# Patient Record
Sex: FEMALE | Race: WHITE | NOT HISPANIC OR LATINO
[De-identification: names, ages, dates, MRNs, and addresses within clinical notes are randomized per-mention and may not be internally consistent; named-entity substitution may affect disease eponyms.]

---

## 2022-08-22 PROBLEM — Z00.00 ENCOUNTER FOR PREVENTIVE HEALTH EXAMINATION: Status: ACTIVE | Noted: 2022-08-22

## 2022-08-22 NOTE — HISTORY OF PRESENT ILLNESS
[de-identified] : TONY SORIANO is a 47 year old female with a PMH of HTN, HLD, DM II, T12-L1 laminectomy posterior instrumented fusion June 2021 by Dr. Hoyt at [facility] *** who presents to the office today self referred for neurosurgical consultation due to herniated disc. She has low back pain.\par The pain is characterized as *** in nature.  It started **** ago. It is exacerbated by *** and relieved by ***.  It radiates ***. There has been no known trauma precipitating the pain..  The patient denies numbness of extremities, weakness of extremities, bowel or bladder incontinence and gait disturbance.  ***has tried BRENNEN, PT, and pain medications including *** in the last *** without relief.  *** has undergone imaging in the form of *** which revealed *** (see detailed report below) \par PMH: per HPI\par PSH:\par FamHx:\par Social Hx:\par Allergies: \par Medications:\par  \par

## 2022-08-22 NOTE — ASSESSMENT
[FreeTextEntry1] : I have discussed the natural history and treatment options for lumbar radiculopathy due to disc herniation with the patient. I explained the indications for observation, conservative management, medical management, physical therapy, pain management approaches and surgery. I explained the different types and surgical approaches including decompression only procedures and instrumented fusions. I discussed the risks, benefits, possible complications and expected outcome related to each treatment option. The risks of surgery were discussed in detail including but not limited to postoperative infection at the surgical site, hospital acquired pneumonia, hospital acquired urinary tract infection, postoperative meningitis, wound dehiscence, CSF leak, stroke (ischemic and hemorrhagic), postoperative seizures, worsening motor function due to spinal cord or nerve injury, postoperative visual deficit which could be permanent (blindness) when surgery is performed in a prone position, cardiovascular complications (MI, PE, DVT) and I also explained that some of these complications could lead to sepsis, coma or even death. I discussed the fact that some of these complications may require subsequent surgical procedure(s) to correct them. \par In the end, I recommend ***\par The patient understands the plan of care and is in agreement.  All questions answered to patient satisfaction.

## 2022-08-29 ENCOUNTER — APPOINTMENT (OUTPATIENT)
Dept: NEUROSURGERY | Facility: CLINIC | Age: 47
End: 2022-08-29

## 2022-09-14 ENCOUNTER — TRANSCRIPTION ENCOUNTER (OUTPATIENT)
Age: 47
End: 2022-09-14

## 2022-09-17 ENCOUNTER — TRANSCRIPTION ENCOUNTER (OUTPATIENT)
Age: 47
End: 2022-09-17

## 2022-12-15 ENCOUNTER — OFFICE (OUTPATIENT)
Dept: URBAN - METROPOLITAN AREA CLINIC 29 | Facility: CLINIC | Age: 47
Setting detail: OPHTHALMOLOGY
End: 2022-12-15
Payer: MEDICARE

## 2022-12-15 DIAGNOSIS — H01.004: ICD-10-CM

## 2022-12-15 DIAGNOSIS — E11.9: ICD-10-CM

## 2022-12-15 DIAGNOSIS — H01.001: ICD-10-CM

## 2022-12-15 DIAGNOSIS — H01.005: ICD-10-CM

## 2022-12-15 DIAGNOSIS — H16.223: ICD-10-CM

## 2022-12-15 DIAGNOSIS — H43.393: ICD-10-CM

## 2022-12-15 DIAGNOSIS — H01.002: ICD-10-CM

## 2022-12-15 DIAGNOSIS — H43.813: ICD-10-CM

## 2022-12-15 PROCEDURE — 92014 COMPRE OPH EXAM EST PT 1/>: CPT | Performed by: OPHTHALMOLOGY

## 2022-12-15 ASSESSMENT — REFRACTION_CURRENTRX
OD_CYLINDER: 0.00
OS_SPHERE: -1.00
OS_CYLINDER: 0.00
OS_AXIS: 090
OD_ADD: +1.50
OS_OVR_VA: 20/
OS_ADD: +1.50
OD_AXIS: 090
OD_SPHERE: -0.50
OD_VPRISM_DIRECTION: BF
OD_OVR_VA: 20/
OS_VPRISM_DIRECTION: BF

## 2022-12-15 ASSESSMENT — LID EXAM ASSESSMENTS
OS_MEIBOMITIS: 2+
OD_MEIBOMITIS: RLL 2+
OD_BLEPHARITIS: RLL RUL 3+
OS_BLEPHARITIS: LLL LUL 3+

## 2022-12-15 ASSESSMENT — REFRACTION_MANIFEST
OD_VA1: 20/25+1
OS_SPHERE: +2.25
OU_VA: 20/20-2
OD_SPHERE: +0.25
OS_ADD: +2.00
OD_ADD: +2.00
OS_VA1: 20/20-2
OS_SPHERE: -0.25
OD_SPHERE: +2.25

## 2022-12-15 ASSESSMENT — REFRACTION_AUTOREFRACTION
OD_AXIS: 140
OD_CYLINDER: +0.50
OD_SPHERE: 0.00
OS_SPHERE: -0.25
OS_CYLINDER: +0.75
OS_AXIS: 50

## 2022-12-15 ASSESSMENT — TEAR BREAK UP TIME (TBUT)
OS_TBUT: 1+
OD_TBUT: 1+

## 2022-12-15 ASSESSMENT — CONFRONTATIONAL VISUAL FIELD TEST (CVF)
OS_FINDINGS: FULL
OD_FINDINGS: FULL

## 2022-12-15 ASSESSMENT — TONOMETRY
OD_IOP_MMHG: 19
OS_IOP_MMHG: 19

## 2022-12-15 ASSESSMENT — VISUAL ACUITY
OS_BCVA: 20/20
OD_BCVA: 20/40-2

## 2022-12-15 ASSESSMENT — SPHEQUIV_DERIVED
OS_SPHEQUIV: 0.125
OD_SPHEQUIV: 0.25

## 2023-01-16 ENCOUNTER — TRANSCRIPTION ENCOUNTER (OUTPATIENT)
Age: 48
End: 2023-01-16

## 2023-02-01 ENCOUNTER — TRANSCRIPTION ENCOUNTER (OUTPATIENT)
Age: 48
End: 2023-02-01

## 2023-03-08 ENCOUNTER — OFFICE (OUTPATIENT)
Dept: URBAN - METROPOLITAN AREA CLINIC 29 | Facility: CLINIC | Age: 48
Setting detail: OPHTHALMOLOGY
End: 2023-03-08
Payer: MEDICARE

## 2023-03-08 DIAGNOSIS — H10.9: ICD-10-CM

## 2023-03-08 PROCEDURE — 92012 INTRM OPH EXAM EST PATIENT: CPT | Performed by: OPTOMETRIST

## 2023-03-08 ASSESSMENT — VISUAL ACUITY
OS_BCVA: 20/20
OD_BCVA: 20/20-2

## 2023-03-08 ASSESSMENT — CONFRONTATIONAL VISUAL FIELD TEST (CVF)
OS_FINDINGS: FULL
OD_FINDINGS: FULL

## 2023-03-08 ASSESSMENT — LID EXAM ASSESSMENTS
OS_BLEPHARITIS: LLL LUL 3+
OD_MEIBOMITIS: RLL 2+
OD_BLEPHARITIS: RLL RUL 3+
OS_MEIBOMITIS: 2+

## 2023-03-08 ASSESSMENT — REFRACTION_CURRENTRX
OD_CYLINDER: 0.00
OD_ADD: +1.50
OD_OVR_VA: 20/
OS_AXIS: 090
OD_AXIS: 090
OD_SPHERE: -0.50
OD_VPRISM_DIRECTION: BF
OS_VPRISM_DIRECTION: BF
OS_OVR_VA: 20/
OS_ADD: +1.50
OS_SPHERE: -1.00
OS_CYLINDER: 0.00

## 2023-03-08 ASSESSMENT — REFRACTION_AUTOREFRACTION
OD_AXIS: 140
OS_AXIS: 50
OS_SPHERE: -0.25
OS_CYLINDER: +0.75
OD_SPHERE: 0.00
OD_CYLINDER: +0.50

## 2023-03-08 ASSESSMENT — REFRACTION_MANIFEST
OD_ADD: +2.00
OD_VA1: 20/25+1
OS_ADD: +2.00
OS_VA1: 20/20-2
OS_SPHERE: -0.25
OD_SPHERE: +2.25
OU_VA: 20/20-2
OD_SPHERE: +0.25
OS_SPHERE: +2.25

## 2023-03-08 ASSESSMENT — SPHEQUIV_DERIVED
OS_SPHEQUIV: 0.125
OD_SPHEQUIV: 0.25

## 2023-03-08 ASSESSMENT — TEAR BREAK UP TIME (TBUT)
OS_TBUT: 1+
OD_TBUT: 1+

## 2023-04-17 ENCOUNTER — HOSPITAL ENCOUNTER (EMERGENCY)
Dept: HOSPITAL 74 - FER | Age: 48
Discharge: LEFT BEFORE DISCHARGE | End: 2023-04-17
Payer: COMMERCIAL

## 2023-04-17 VITALS
HEART RATE: 78 BPM | SYSTOLIC BLOOD PRESSURE: 107 MMHG | DIASTOLIC BLOOD PRESSURE: 75 MMHG | TEMPERATURE: 98.1 F | RESPIRATION RATE: 19 BRPM

## 2023-04-17 VITALS — BODY MASS INDEX: 37.9 KG/M2

## 2023-04-17 DIAGNOSIS — R10.31: Primary | ICD-10-CM

## 2023-04-18 ENCOUNTER — OFFICE (OUTPATIENT)
Dept: URBAN - METROPOLITAN AREA CLINIC 29 | Facility: CLINIC | Age: 48
Setting detail: OPHTHALMOLOGY
End: 2023-04-18
Payer: MEDICARE

## 2023-04-18 DIAGNOSIS — H01.002: ICD-10-CM

## 2023-04-18 DIAGNOSIS — H01.004: ICD-10-CM

## 2023-04-18 DIAGNOSIS — E11.9: ICD-10-CM

## 2023-04-18 DIAGNOSIS — H43.393: ICD-10-CM

## 2023-04-18 DIAGNOSIS — H43.813: ICD-10-CM

## 2023-04-18 DIAGNOSIS — H01.005: ICD-10-CM

## 2023-04-18 DIAGNOSIS — H16.223: ICD-10-CM

## 2023-04-18 DIAGNOSIS — H01.001: ICD-10-CM

## 2023-04-18 PROCEDURE — 92014 COMPRE OPH EXAM EST PT 1/>: CPT | Performed by: OPHTHALMOLOGY

## 2023-04-18 PROCEDURE — 92201 OPSCPY EXTND RTA DRAW UNI/BI: CPT | Performed by: OPHTHALMOLOGY

## 2023-04-18 ASSESSMENT — REFRACTION_MANIFEST
OD_SPHERE: +2.25
OD_ADD: +2.00
OD_VA1: 20/25+1
OS_ADD: +2.00
OS_VA1: 20/20-2
OS_SPHERE: -0.25
OD_SPHERE: +0.25
OS_SPHERE: +2.25
OU_VA: 20/20-2

## 2023-04-18 ASSESSMENT — REFRACTION_CURRENTRX
OD_OVR_VA: 20/
OS_CYLINDER: 0.00
OS_AXIS: 090
OS_OVR_VA: 20/
OD_SPHERE: -0.50
OS_SPHERE: -1.00
OS_ADD: +1.50
OS_VPRISM_DIRECTION: BF
OD_AXIS: 090
OD_ADD: +1.50
OD_CYLINDER: 0.00
OD_VPRISM_DIRECTION: BF

## 2023-04-18 ASSESSMENT — TONOMETRY
OD_IOP_MMHG: 17
OS_IOP_MMHG: 18

## 2023-04-18 ASSESSMENT — REFRACTION_AUTOREFRACTION
OD_AXIS: 123
OS_AXIS: 041
OD_SPHERE: 0.00
OD_CYLINDER: +0.50
OS_SPHERE: -0.25
OS_CYLINDER: +0.50

## 2023-04-18 ASSESSMENT — AXIALLENGTH_DERIVED
OS_AL: 23.8706
OD_AL: 23.7713

## 2023-04-18 ASSESSMENT — TEAR BREAK UP TIME (TBUT)
OD_TBUT: 1+
OS_TBUT: 1+ 2+

## 2023-04-18 ASSESSMENT — KERATOMETRY
OS_K2POWER_DIOPTERS: 43.00
OS_K1POWER_DIOPTERS: 42.50
OD_K1POWER_DIOPTERS: 42.50
OD_AXISANGLE_DEGREES: 114
OS_AXISANGLE_DEGREES: 083
OD_K2POWER_DIOPTERS: 43.00

## 2023-04-18 ASSESSMENT — LID EXAM ASSESSMENTS
OD_BLEPHARITIS: RLL RUL 3+
OS_MEIBOMITIS: 2+
OD_MEIBOMITIS: RLL 2+
OS_BLEPHARITIS: LLL LUL 3+

## 2023-04-18 ASSESSMENT — SPHEQUIV_DERIVED
OS_SPHEQUIV: 0
OD_SPHEQUIV: 0.25

## 2023-04-18 ASSESSMENT — CONFRONTATIONAL VISUAL FIELD TEST (CVF)
OS_FINDINGS: FULL
OD_FINDINGS: FULL

## 2023-04-18 ASSESSMENT — VISUAL ACUITY
OS_BCVA: 20/25
OD_BCVA: 20/20

## 2023-05-11 ENCOUNTER — RESULT REVIEW (OUTPATIENT)
Age: 48
End: 2023-05-11

## 2023-06-11 ENCOUNTER — TRANSCRIPTION ENCOUNTER (OUTPATIENT)
Age: 48
End: 2023-06-11

## 2023-08-07 ENCOUNTER — OFFICE (OUTPATIENT)
Dept: URBAN - METROPOLITAN AREA CLINIC 29 | Facility: CLINIC | Age: 48
Setting detail: OPHTHALMOLOGY
End: 2023-08-07
Payer: MEDICARE

## 2023-08-07 ENCOUNTER — RX ONLY (RX ONLY)
Age: 48
End: 2023-08-07

## 2023-08-07 DIAGNOSIS — H10.9: ICD-10-CM

## 2023-08-07 DIAGNOSIS — H16.223: ICD-10-CM

## 2023-08-07 PROCEDURE — 92012 INTRM OPH EXAM EST PATIENT: CPT | Performed by: OPTOMETRIST

## 2023-08-07 ASSESSMENT — REFRACTION_CURRENTRX
OD_CYLINDER: 0.00
OD_VPRISM_DIRECTION: BF
OD_OVR_VA: 20/
OS_OVR_VA: 20/
OS_VPRISM_DIRECTION: BF
OS_ADD: +1.50
OS_CYLINDER: 0.00
OD_AXIS: 090
OS_AXIS: 090
OS_SPHERE: -1.00
OD_SPHERE: -0.50
OD_ADD: +1.50

## 2023-08-07 ASSESSMENT — REFRACTION_MANIFEST
OU_VA: 20/20-2
OD_SPHERE: +0.25
OS_SPHERE: +2.25
OS_ADD: +2.00
OD_SPHERE: +2.25
OD_VA1: 20/25+1
OS_SPHERE: -0.25
OS_VA1: 20/20-2
OD_ADD: +2.00

## 2023-08-07 ASSESSMENT — LID EXAM ASSESSMENTS
OS_BLEPHARITIS: LLL LUL 3+
OD_BLEPHARITIS: RLL RUL 3+
OS_MEIBOMITIS: 2+
OD_MEIBOMITIS: RLL 2+

## 2023-08-07 ASSESSMENT — SPHEQUIV_DERIVED
OD_SPHEQUIV: 0.25
OS_SPHEQUIV: 0

## 2023-08-07 ASSESSMENT — REFRACTION_AUTOREFRACTION
OS_CYLINDER: +0.50
OD_SPHERE: 0.00
OD_CYLINDER: +0.50
OS_AXIS: 041
OS_SPHERE: -0.25
OD_AXIS: 123

## 2023-08-07 ASSESSMENT — VISUAL ACUITY
OD_BCVA: 20/20
OS_BCVA: 20/25

## 2023-08-07 ASSESSMENT — CONFRONTATIONAL VISUAL FIELD TEST (CVF)
OS_FINDINGS: FULL
OD_FINDINGS: FULL

## 2023-08-07 ASSESSMENT — KERATOMETRY
OD_K1POWER_DIOPTERS: 42.50
OS_K1POWER_DIOPTERS: 42.50
OD_AXISANGLE_DEGREES: 114
OD_K2POWER_DIOPTERS: 43.00
OS_K2POWER_DIOPTERS: 43.00
OS_AXISANGLE_DEGREES: 083

## 2023-08-07 ASSESSMENT — TEAR BREAK UP TIME (TBUT)
OD_TBUT: 1+
OS_TBUT: 1+ 2+

## 2023-08-07 ASSESSMENT — AXIALLENGTH_DERIVED
OS_AL: 23.8706
OD_AL: 23.7713

## 2023-10-03 ENCOUNTER — TRANSCRIPTION ENCOUNTER (OUTPATIENT)
Age: 48
End: 2023-10-03

## 2023-10-10 ENCOUNTER — TRANSCRIPTION ENCOUNTER (OUTPATIENT)
Age: 48
End: 2023-10-10

## 2023-11-13 ENCOUNTER — OFFICE (OUTPATIENT)
Dept: URBAN - METROPOLITAN AREA CLINIC 29 | Facility: CLINIC | Age: 48
Setting detail: OPHTHALMOLOGY
End: 2023-11-13
Payer: MEDICARE

## 2023-11-13 DIAGNOSIS — H16.223: ICD-10-CM

## 2023-11-13 DIAGNOSIS — H01.004: ICD-10-CM

## 2023-11-13 DIAGNOSIS — E11.9: ICD-10-CM

## 2023-11-13 DIAGNOSIS — H43.813: ICD-10-CM

## 2023-11-13 DIAGNOSIS — H43.393: ICD-10-CM

## 2023-11-13 DIAGNOSIS — H01.001: ICD-10-CM

## 2023-11-13 DIAGNOSIS — H01.005: ICD-10-CM

## 2023-11-13 DIAGNOSIS — H01.002: ICD-10-CM

## 2023-11-13 PROCEDURE — 92014 COMPRE OPH EXAM EST PT 1/>: CPT | Performed by: OPHTHALMOLOGY

## 2023-11-13 ASSESSMENT — REFRACTION_CURRENTRX
OD_SPHERE: -0.50
OS_VPRISM_DIRECTION: BF
OD_AXIS: 090
OS_CYLINDER: 0.00
OS_OVR_VA: 20/
OD_ADD: +1.50
OD_CYLINDER: 0.00
OS_AXIS: 090
OS_SPHERE: -1.00
OD_VPRISM_DIRECTION: BF
OD_OVR_VA: 20/
OS_ADD: +1.50

## 2023-11-13 ASSESSMENT — REFRACTION_MANIFEST
OD_SPHERE: +0.25
OD_VA1: 20/25+1
OU_VA: 20/20-2
OS_SPHERE: -0.25
OD_ADD: +2.00
OS_SPHERE: +2.25
OD_SPHERE: +2.25
OS_ADD: +2.00
OS_VA1: 20/20-2

## 2023-11-13 ASSESSMENT — REFRACTION_AUTOREFRACTION
OD_CYLINDER: +0.50
OS_AXIS: 058
OS_CYLINDER: +0.75
OD_AXIS: 124
OD_SPHERE: -0.50
OS_SPHERE: -0.75

## 2023-11-13 ASSESSMENT — LID EXAM ASSESSMENTS
OD_BLEPHARITIS: RLL RUL 3+
OS_MEIBOMITIS: 2+
OS_BLEPHARITIS: LLL LUL 3+
OD_MEIBOMITIS: RLL 2+

## 2023-11-13 ASSESSMENT — TEAR BREAK UP TIME (TBUT)
OS_TBUT: 1+ 2+
OD_TBUT: 1+

## 2023-11-13 ASSESSMENT — CONFRONTATIONAL VISUAL FIELD TEST (CVF)
OD_FINDINGS: FULL
OS_FINDINGS: FULL

## 2023-11-13 ASSESSMENT — SPHEQUIV_DERIVED
OD_SPHEQUIV: -0.25
OS_SPHEQUIV: -0.375

## 2024-02-22 ENCOUNTER — OFFICE (OUTPATIENT)
Dept: URBAN - METROPOLITAN AREA CLINIC 29 | Facility: CLINIC | Age: 49
Setting detail: OPHTHALMOLOGY
End: 2024-02-22
Payer: MEDICARE

## 2024-02-22 DIAGNOSIS — H16.223: ICD-10-CM

## 2024-02-22 DIAGNOSIS — H01.005: ICD-10-CM

## 2024-02-22 DIAGNOSIS — H10.45: ICD-10-CM

## 2024-02-22 DIAGNOSIS — H01.002: ICD-10-CM

## 2024-02-22 DIAGNOSIS — H01.004: ICD-10-CM

## 2024-02-22 DIAGNOSIS — H01.001: ICD-10-CM

## 2024-02-22 PROCEDURE — 92012 INTRM OPH EXAM EST PATIENT: CPT | Performed by: OPHTHALMOLOGY

## 2024-02-22 ASSESSMENT — REFRACTION_CURRENTRX
OS_AXIS: 090
OS_CYLINDER: 0.00
OD_OVR_VA: 20/
OD_AXIS: 090
OS_OVR_VA: 20/
OD_CYLINDER: 0.00
OD_ADD: +1.50
OS_VPRISM_DIRECTION: BF
OS_SPHERE: -1.00
OD_SPHERE: -0.50
OD_VPRISM_DIRECTION: BF
OS_ADD: +1.50

## 2024-02-22 ASSESSMENT — REFRACTION_MANIFEST
OD_SPHERE: +2.25
OU_VA: 20/20-2
OD_SPHERE: +0.25
OD_VA1: 20/25+1
OD_ADD: +2.00
OS_SPHERE: +2.25
OS_SPHERE: -0.25
OS_VA1: 20/20-2
OS_ADD: +2.00

## 2024-02-22 ASSESSMENT — REFRACTION_AUTOREFRACTION
OS_CYLINDER: +0.75
OS_SPHERE: -0.75
OD_SPHERE: -0.50
OS_AXIS: 058
OD_CYLINDER: +0.50
OD_AXIS: 124

## 2024-02-22 ASSESSMENT — TEAR BREAK UP TIME (TBUT)
OS_TBUT: 1+ 2+
OD_TBUT: 1+

## 2024-02-22 ASSESSMENT — LID EXAM ASSESSMENTS
OS_BLEPHARITIS: LLL LUL 3+
OD_BLEPHARITIS: RLL RUL 3+
OS_MEIBOMITIS: 2+
OD_MEIBOMITIS: RLL 2+

## 2024-02-22 ASSESSMENT — SPHEQUIV_DERIVED
OS_SPHEQUIV: -0.375
OD_SPHEQUIV: -0.25

## 2024-02-22 ASSESSMENT — CONFRONTATIONAL VISUAL FIELD TEST (CVF)
OS_FINDINGS: FULL
OD_FINDINGS: FULL

## 2024-03-28 ENCOUNTER — TRANSCRIPTION ENCOUNTER (OUTPATIENT)
Age: 49
End: 2024-03-28

## 2024-08-15 ENCOUNTER — OFFICE (OUTPATIENT)
Dept: URBAN - METROPOLITAN AREA CLINIC 29 | Facility: CLINIC | Age: 49
Setting detail: OPHTHALMOLOGY
End: 2024-08-15
Payer: MEDICARE

## 2024-08-15 DIAGNOSIS — H01.005: ICD-10-CM

## 2024-08-15 DIAGNOSIS — H01.001: ICD-10-CM

## 2024-08-15 DIAGNOSIS — H01.002: ICD-10-CM

## 2024-08-15 DIAGNOSIS — H10.45: ICD-10-CM

## 2024-08-15 DIAGNOSIS — H01.004: ICD-10-CM

## 2024-08-15 DIAGNOSIS — H16.223: ICD-10-CM

## 2024-08-15 PROCEDURE — 92012 INTRM OPH EXAM EST PATIENT: CPT | Performed by: OPHTHALMOLOGY

## 2024-08-15 ASSESSMENT — CONFRONTATIONAL VISUAL FIELD TEST (CVF)
OS_FINDINGS: FULL
OD_FINDINGS: FULL

## 2024-08-15 ASSESSMENT — LID EXAM ASSESSMENTS
OD_MEIBOMITIS: RLL 2+
OS_MEIBOMITIS: 2+
OD_BLEPHARITIS: RLL RUL 3+
OS_BLEPHARITIS: LLL LUL 3+

## 2024-09-24 DIAGNOSIS — M54.16 RADICULOPATHY, LUMBAR REGION: ICD-10-CM

## 2024-10-18 ENCOUNTER — APPOINTMENT (OUTPATIENT)
Dept: PAIN MANAGEMENT | Facility: CLINIC | Age: 49
End: 2024-10-18
Payer: MEDICARE

## 2024-10-18 VITALS
WEIGHT: 212 LBS | HEIGHT: 65 IN | SYSTOLIC BLOOD PRESSURE: 117 MMHG | DIASTOLIC BLOOD PRESSURE: 84 MMHG | BODY MASS INDEX: 35.32 KG/M2

## 2024-10-18 DIAGNOSIS — R73.03 PREDIABETES.: ICD-10-CM

## 2024-10-18 DIAGNOSIS — M54.16 RADICULOPATHY, LUMBAR REGION: ICD-10-CM

## 2024-10-18 DIAGNOSIS — M79.18 MYALGIA, OTHER SITE: ICD-10-CM

## 2024-10-18 DIAGNOSIS — M79.2 NEURALGIA AND NEURITIS, UNSPECIFIED: ICD-10-CM

## 2024-10-18 DIAGNOSIS — Z86.39 PERSONAL HISTORY OF OTHER ENDOCRINE, NUTRITIONAL AND METABOLIC DISEASE: ICD-10-CM

## 2024-10-18 DIAGNOSIS — M96.1 POSTLAMINECTOMY SYNDROME, NOT ELSEWHERE CLASSIFIED: ICD-10-CM

## 2024-10-18 DIAGNOSIS — G89.4 CHRONIC PAIN SYNDROME: ICD-10-CM

## 2024-10-18 DIAGNOSIS — Z86.79 PERSONAL HISTORY OF OTHER DISEASES OF THE CIRCULATORY SYSTEM: ICD-10-CM

## 2024-10-18 PROCEDURE — 99214 OFFICE O/P EST MOD 30 MIN: CPT

## 2024-10-18 PROCEDURE — G2211 COMPLEX E/M VISIT ADD ON: CPT

## 2024-10-18 RX ORDER — GLIPIZIDE 2.5 MG/1
TABLET ORAL
Refills: 0 | Status: ACTIVE | COMMUNITY

## 2024-10-18 RX ORDER — SIMVASTATIN 80 MG/1
TABLET, FILM COATED ORAL
Refills: 0 | Status: ACTIVE | COMMUNITY

## 2024-10-18 RX ORDER — LISINOPRIL 30 MG/1
TABLET ORAL
Refills: 0 | Status: ACTIVE | COMMUNITY

## 2024-10-18 RX ORDER — OXYCODONE AND ACETAMINOPHEN 5; 325 MG/1; MG/1
5-325 TABLET ORAL
Refills: 0 | Status: ACTIVE | COMMUNITY

## 2024-10-18 RX ORDER — METFORMIN HYDROCHLORIDE 625 MG/1
TABLET ORAL
Refills: 0 | Status: ACTIVE | COMMUNITY

## 2024-10-23 ENCOUNTER — RESULT REVIEW (OUTPATIENT)
Age: 49
End: 2024-10-23

## 2024-11-01 ENCOUNTER — NON-APPOINTMENT (OUTPATIENT)
Age: 49
End: 2024-11-01

## 2024-11-01 ENCOUNTER — APPOINTMENT (OUTPATIENT)
Dept: NEUROSURGERY | Facility: CLINIC | Age: 49
End: 2024-11-01
Payer: MEDICARE

## 2024-11-01 VITALS
WEIGHT: 212 LBS | DIASTOLIC BLOOD PRESSURE: 64 MMHG | OXYGEN SATURATION: 93 % | HEIGHT: 65 IN | BODY MASS INDEX: 35.32 KG/M2 | SYSTOLIC BLOOD PRESSURE: 130 MMHG | HEART RATE: 60 BPM

## 2024-11-01 DIAGNOSIS — M54.16 RADICULOPATHY, LUMBAR REGION: ICD-10-CM

## 2024-11-01 DIAGNOSIS — M96.1 POSTLAMINECTOMY SYNDROME, NOT ELSEWHERE CLASSIFIED: ICD-10-CM

## 2024-11-01 PROCEDURE — 99205 OFFICE O/P NEW HI 60 MIN: CPT

## 2025-03-28 ENCOUNTER — OFFICE (OUTPATIENT)
Dept: URBAN - METROPOLITAN AREA CLINIC 29 | Facility: CLINIC | Age: 50
Setting detail: OPHTHALMOLOGY
End: 2025-03-28
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
## 2025-04-21 ENCOUNTER — APPOINTMENT (OUTPATIENT)
Dept: FAMILY MEDICINE | Facility: CLINIC | Age: 50
End: 2025-04-21

## 2025-05-12 ENCOUNTER — APPOINTMENT (OUTPATIENT)
Dept: OBGYN | Facility: CLINIC | Age: 50
End: 2025-05-12

## 2025-06-16 ENCOUNTER — APPOINTMENT (OUTPATIENT)
Dept: OBGYN | Facility: CLINIC | Age: 50
End: 2025-06-16

## 2025-06-19 ENCOUNTER — APPOINTMENT (OUTPATIENT)
Dept: GASTROENTEROLOGY | Facility: CLINIC | Age: 50
End: 2025-06-19

## 2025-07-08 ENCOUNTER — APPOINTMENT (OUTPATIENT)
Dept: FAMILY MEDICINE | Facility: CLINIC | Age: 50
End: 2025-07-08

## 2025-09-16 ENCOUNTER — RESULT REVIEW (OUTPATIENT)
Age: 50
End: 2025-09-16